# Patient Record
Sex: FEMALE | Race: WHITE | NOT HISPANIC OR LATINO | URBAN - METROPOLITAN AREA
[De-identification: names, ages, dates, MRNs, and addresses within clinical notes are randomized per-mention and may not be internally consistent; named-entity substitution may affect disease eponyms.]

---

## 2021-12-10 ENCOUNTER — OFFICE VISIT (OUTPATIENT)
Dept: PEDIATRICS | Facility: CLINIC | Age: 21
End: 2021-12-10
Payer: COMMERCIAL

## 2021-12-10 VITALS — DIASTOLIC BLOOD PRESSURE: 76 MMHG | HEART RATE: 83 BPM | SYSTOLIC BLOOD PRESSURE: 118 MMHG | WEIGHT: 150.19 LBS

## 2021-12-10 DIAGNOSIS — F90.0 ADHD (ATTENTION DEFICIT HYPERACTIVITY DISORDER), INATTENTIVE TYPE: Primary | ICD-10-CM

## 2021-12-10 DIAGNOSIS — F41.9 ANXIETY: ICD-10-CM

## 2021-12-10 PROCEDURE — 99999 PR PBB SHADOW E&M-EST. PATIENT-LVL II: CPT | Mod: PBBFAC,,, | Performed by: PEDIATRICS

## 2021-12-10 PROCEDURE — 99999 PR PBB SHADOW E&M-EST. PATIENT-LVL II: ICD-10-PCS | Mod: PBBFAC,,, | Performed by: PEDIATRICS

## 2021-12-10 PROCEDURE — 99214 PR OFFICE/OUTPT VISIT, EST, LEVL IV, 30-39 MIN: ICD-10-PCS | Mod: S$GLB,,, | Performed by: PEDIATRICS

## 2021-12-10 PROCEDURE — 99214 OFFICE O/P EST MOD 30 MIN: CPT | Mod: S$GLB,,, | Performed by: PEDIATRICS

## 2021-12-10 RX ORDER — LISDEXAMFETAMINE DIMESYLATE 30 MG/1
20 CAPSULE ORAL NIGHTLY
COMMUNITY
Start: 2021-10-21

## 2021-12-10 RX ORDER — LISDEXAMFETAMINE DIMESYLATE 30 MG/1
30 CAPSULE ORAL EVERY MORNING
Qty: 30 CAPSULE | Refills: 0 | Status: SHIPPED | OUTPATIENT
Start: 2022-01-09 | End: 2022-02-08

## 2021-12-10 RX ORDER — BUPROPION HYDROCHLORIDE 300 MG/1
300 TABLET ORAL DAILY
Qty: 30 TABLET | Refills: 2 | Status: SHIPPED | OUTPATIENT
Start: 2021-12-10 | End: 2022-03-10

## 2021-12-10 RX ORDER — LISDEXAMFETAMINE DIMESYLATE 30 MG/1
30 CAPSULE ORAL EVERY MORNING
Qty: 30 CAPSULE | Refills: 0 | Status: SHIPPED | OUTPATIENT
Start: 2022-02-08 | End: 2022-03-10

## 2021-12-10 RX ORDER — BUPROPION HYDROCHLORIDE 150 MG/1
300 TABLET ORAL DAILY
COMMUNITY
Start: 2021-10-21

## 2021-12-10 RX ORDER — LISDEXAMFETAMINE DIMESYLATE 30 MG/1
30 CAPSULE ORAL EVERY MORNING
Qty: 30 CAPSULE | Refills: 0 | Status: SHIPPED | OUTPATIENT
Start: 2021-12-10 | End: 2022-01-09

## 2022-01-03 NOTE — TELEPHONE ENCOUNTER
Refill request received from [facility] for Wellbutrin XL 300mg #90. Last anxiety and ADHD appt was 12/10/21. Medication was filled at appt on 12/10 for #30 with 2rf. Refill request denied. Appt due in march for refills.

## 2022-03-10 ENCOUNTER — TELEPHONE (OUTPATIENT)
Dept: PEDIATRICS | Facility: CLINIC | Age: 22
End: 2022-03-10
Payer: COMMERCIAL

## 2023-01-26 ENCOUNTER — OFFICE VISIT (OUTPATIENT)
Dept: PEDIATRICS | Facility: CLINIC | Age: 23
End: 2023-01-26
Payer: COMMERCIAL

## 2023-01-26 VITALS
WEIGHT: 162.25 LBS | HEART RATE: 81 BPM | SYSTOLIC BLOOD PRESSURE: 128 MMHG | TEMPERATURE: 98 F | BODY MASS INDEX: 25.47 KG/M2 | HEIGHT: 67 IN | DIASTOLIC BLOOD PRESSURE: 79 MMHG

## 2023-01-26 DIAGNOSIS — F90.2 ADHD (ATTENTION DEFICIT HYPERACTIVITY DISORDER), COMBINED TYPE: Primary | ICD-10-CM

## 2023-01-26 DIAGNOSIS — M41.9 SCOLIOSIS, UNSPECIFIED SCOLIOSIS TYPE, UNSPECIFIED SPINAL REGION: ICD-10-CM

## 2023-01-26 DIAGNOSIS — F41.9 ANXIETY: ICD-10-CM

## 2023-01-26 PROCEDURE — 3008F BODY MASS INDEX DOCD: CPT | Mod: CPTII,S$GLB,, | Performed by: PEDIATRICS

## 2023-01-26 PROCEDURE — 99395 PREV VISIT EST AGE 18-39: CPT | Mod: 25,S$GLB,, | Performed by: PEDIATRICS

## 2023-01-26 PROCEDURE — 3008F PR BODY MASS INDEX (BMI) DOCUMENTED: ICD-10-PCS | Mod: CPTII,S$GLB,, | Performed by: PEDIATRICS

## 2023-01-26 PROCEDURE — 3074F SYST BP LT 130 MM HG: CPT | Mod: CPTII,S$GLB,, | Performed by: PEDIATRICS

## 2023-01-26 PROCEDURE — 1159F PR MEDICATION LIST DOCUMENTED IN MEDICAL RECORD: ICD-10-PCS | Mod: CPTII,S$GLB,, | Performed by: PEDIATRICS

## 2023-01-26 PROCEDURE — 3078F PR MOST RECENT DIASTOLIC BLOOD PRESSURE < 80 MM HG: ICD-10-PCS | Mod: CPTII,S$GLB,, | Performed by: PEDIATRICS

## 2023-01-26 PROCEDURE — 3078F DIAST BP <80 MM HG: CPT | Mod: CPTII,S$GLB,, | Performed by: PEDIATRICS

## 2023-01-26 PROCEDURE — 99214 OFFICE O/P EST MOD 30 MIN: CPT | Mod: 25,S$GLB,, | Performed by: PEDIATRICS

## 2023-01-26 PROCEDURE — 1160F RVW MEDS BY RX/DR IN RCRD: CPT | Mod: CPTII,S$GLB,, | Performed by: PEDIATRICS

## 2023-01-26 PROCEDURE — 1160F PR REVIEW ALL MEDS BY PRESCRIBER/CLIN PHARMACIST DOCUMENTED: ICD-10-PCS | Mod: CPTII,S$GLB,, | Performed by: PEDIATRICS

## 2023-01-26 PROCEDURE — 99395 PR PREVENTIVE VISIT,EST,18-39: ICD-10-PCS | Mod: 25,S$GLB,, | Performed by: PEDIATRICS

## 2023-01-26 PROCEDURE — 99999 PR PBB SHADOW E&M-EST. PATIENT-LVL III: ICD-10-PCS | Mod: PBBFAC,,, | Performed by: PEDIATRICS

## 2023-01-26 PROCEDURE — 3074F PR MOST RECENT SYSTOLIC BLOOD PRESSURE < 130 MM HG: ICD-10-PCS | Mod: CPTII,S$GLB,, | Performed by: PEDIATRICS

## 2023-01-26 PROCEDURE — 99999 PR PBB SHADOW E&M-EST. PATIENT-LVL III: CPT | Mod: PBBFAC,,, | Performed by: PEDIATRICS

## 2023-01-26 PROCEDURE — 99214 PR OFFICE/OUTPT VISIT, EST, LEVL IV, 30-39 MIN: ICD-10-PCS | Mod: 25,S$GLB,, | Performed by: PEDIATRICS

## 2023-01-26 PROCEDURE — 1159F MED LIST DOCD IN RCRD: CPT | Mod: CPTII,S$GLB,, | Performed by: PEDIATRICS

## 2023-01-26 RX ORDER — LISDEXAMFETAMINE DIMESYLATE 30 MG/1
30 CAPSULE ORAL EVERY MORNING
Qty: 30 CAPSULE | Refills: 0 | Status: SHIPPED | OUTPATIENT
Start: 2023-03-27 | End: 2023-04-26

## 2023-01-26 RX ORDER — LISDEXAMFETAMINE DIMESYLATE 30 MG/1
30 CAPSULE ORAL EVERY MORNING
Qty: 30 CAPSULE | Refills: 0 | Status: SHIPPED | OUTPATIENT
Start: 2023-02-25 | End: 2023-03-27

## 2023-01-26 RX ORDER — LISDEXAMFETAMINE DIMESYLATE 30 MG/1
30 CAPSULE ORAL EVERY MORNING
Qty: 30 CAPSULE | Refills: 0 | Status: SHIPPED | OUTPATIENT
Start: 2023-01-26 | End: 2023-02-25

## 2023-01-26 RX ORDER — BUPROPION HYDROCHLORIDE 150 MG/1
150 TABLET ORAL DAILY
Qty: 90 TABLET | Refills: 0 | Status: SHIPPED | OUTPATIENT
Start: 2023-01-26 | End: 2023-04-26

## 2023-01-26 NOTE — PATIENT INSTRUCTIONS
Dr. Durham, Saima Willett Cook  Pediatric and Adolescent Medicine  [phone]        [facility]    Nutrition:  - Limit snacks, fast food, dessert, junk food.  Eat regular meals    Teeth:  FLOSS, brush minimum twice a day  Fluoride mouth wash, i. e. Act  - Dentist regularly  - You do want teeth after [age] years old?    Toxics:  - Alcohol, drugs, tobacco  Lots of availability, temptation    Driving:  over 5,000 college age die and >500,000 are injured each year from MVAs  Look three cars ahead  Do NOT text and drive  Wear your seat belt  Use your mirrors with constant surveillance    Breast Self Exam:  - 1/7 women develop breast cancer, check yourself regularly    Dating:  - Collins are sexually driven, girls have a need to be loved  - Do you want to be a parent right now?  - Abstain  - Learn from all your relationships  - If break-up, speak nicely of him/her  - You will find the right partner at the right time.    Academics:  - First semester of college is the:  --Easiest- much is review  --Hardest-tough transition, no supervision    - Formula for Success  Prepare- read through material before class  Attend class  Review notes same day as class  Back-it-up:  Before test study at least three days before test  Professor- pick, if possible, after reviews by friends or <Third Chicken> or <Medminder>  Meet your professor outside of class, so they know your face    -Extra help- take advantage of study skills classes, academic centers or tutors.  Get ahead of any potential problems.  - Ideas- study partners or groups, but must study (not just social)      Exercise:  Don't be a couch potato  Work toward an hour each day of aerobic exercise (min 30 minutes)  Play tennis, golf, swim, walk/run, etc    Stay in touch:  - Contact your parents regularly during your schooling and beyond- mom & dad  - text is really easy    Our office:  - We are honored to continue being your doctor at least until you finish  schooling.  - Once 18 years old, you have doctor/patient confidentiality.  Your parents cannot receive information from us unless you allow.    Immunizations:  - MCV4 (Meningococcal), need two, one after 16 years old  - TdaP (Tetanus)- within last 10 years  - HPV- helps cervical cancer in girls; urogenital cancer in boys and girls

## 2023-01-26 NOTE — PROGRESS NOTES
"  Subjective  Niya Melissa is a 22 y.o. female who is here for a checkup accompanied by sibling, who is a historian.      Subjective:     HISTORY:    Interval History / Parental Concerns: refill medications    School:[employer] majoring in Art History  Grade: Sophomore- did poorly last semester.   Progress/Grades:  good academic standing    Concerns:        Subjective:   HPI:    her  ADHD symptoms have improved when taking them since last visit.    Attentive with homework: N/A    Side effects of Medicine:  Sleep, appetite or other? none    Current ADHD Medication/Dose: Vyvanse 30 mg           Anxiety symptoms have worsened but has not been taking them consistently since last visit.    Counselor, if seeing: yes, Robyn Romeo LPC    Patient feels controlled on medication: yes  Panic attacks: yes    Current Anxiety Medication/Dose: Wellbutrin XL 150mg- taking intermittently  Any side effects of medication: dizziness, drowsiness, dry mouth, appetite changes?  none          Review of patient's allergies indicates:  No Known Allergies    Patient Active Problem List   Diagnosis    Scoliosis    Anxiety    ADHD (attention deficit hyperactivity disorder), combined type           Objective:      PHYSICAL EXAM  Vitals:    01/26/23 0937   BP: 128/79   BP Location: Left arm   Patient Position: Sitting   BP Method: Medium (Automatic)   Pulse: 81   Temp: 98.2 °F (36.8 °C)   TempSrc: Oral   Weight: 73.6 kg (162 lb 4 oz)   Height: 5' 7" (1.702 m)         Height Percentile for Age  Facility age limit for growth percentiles is 20 years.    Weight Percentile for Age  Facility age limit for growth percentiles is 20 years.    Body Mass Index  Body mass index is 25.41 kg/m².  Facility age limit for growth percentiles is 20 years.      Physical Exam  Vitals reviewed.   Constitutional:       Appearance: Normal appearance.   HENT:      Right Ear: Tympanic membrane normal.      Left Ear: Tympanic membrane normal.      Nose: " Nose normal.      Mouth/Throat:      Pharynx: Oropharynx is clear.   Eyes:      Conjunctiva/sclera: Conjunctivae normal.   Cardiovascular:      Rate and Rhythm: Normal rate and regular rhythm.      Heart sounds: Normal heart sounds. No murmur heard.    No friction rub. No gallop.   Pulmonary:      Breath sounds: Normal breath sounds.   Abdominal:      Palpations: Abdomen is soft.      Tenderness: There is no abdominal tenderness.   Musculoskeletal:         General: Normal range of motion.      Cervical back: Neck supple.   Skin:     Findings: No rash.   Neurological:      General: No focal deficit present.         Assessment/Plan:      ADHD (attention deficit hyperactivity disorder), combined type  -     lisdexamfetamine (VYVANSE) 30 MG capsule; Take 1 capsule (30 mg total) by mouth every morning.  Dispense: 30 capsule; Refill: 0  -     lisdexamfetamine (VYVANSE) 30 MG capsule; Take 1 capsule (30 mg total) by mouth every morning.  Dispense: 30 capsule; Refill: 0  -     lisdexamfetamine (VYVANSE) 30 MG capsule; Take 1 capsule (30 mg total) by mouth every morning.  Dispense: 30 capsule; Refill: 0    Anxiety  -     buPROPion (WELLBUTRIN XL) 150 MG TB24 tablet; Take 1 tablet (150 mg total) by mouth once daily.  Dispense: 90 tablet; Refill: 0    Scoliosis, unspecified scoliosis type, unspecified spinal region      Healthy     PLAN:  1.  Discussed anticipatory guidance (including nutrition, education, safety, dental care, discipline, family) and given age appropriate hand out  2.  Immunizations received.  May give Acetaminophen (Tylenol).  3.  Discussed after hours care and advice - call [phone] (our office).  4.  Follow-up at next well child visit (Regular Supervision Visit) in one year or sooner prn.        Next scheduled follow-up appointment for ADD/ADHD management will be in 3 months.  If changes are made to medications, then will need to follow up in three to four weeks.    We discussed the management goals for  patients with ADHD:  1. Adequate Control of Focus and/or Behavior.  2. Tolerable Medication Side Effects (Including some Loss of Appetite).  Need to maintain weight.  3. Function well in life, school and/or work.              Next scheduled follow-up appointment for Anxiety management will be in 3 months.  If changes are made to medications, then will need to follow up in three to four weeks.  Call or see us immediately if self harm thoughts surface.        We discussed the management goals for patients with Anxiety:  1. Adequate Control of Anxiety.  2. Better understanding of anxious feelings.  3. Tolerable Medication Side-Effects (Including Loss of Appetite and Insomnia).  4. Function well in life, school and/or work.      Keep core strength great.

## 2023-04-26 ENCOUNTER — OFFICE VISIT (OUTPATIENT)
Dept: PEDIATRICS | Facility: CLINIC | Age: 23
End: 2023-04-26
Payer: COMMERCIAL

## 2023-04-26 VITALS
WEIGHT: 166 LBS | TEMPERATURE: 98 F | HEIGHT: 67 IN | HEART RATE: 81 BPM | DIASTOLIC BLOOD PRESSURE: 84 MMHG | BODY MASS INDEX: 26.06 KG/M2 | SYSTOLIC BLOOD PRESSURE: 128 MMHG

## 2023-04-26 DIAGNOSIS — F90.2 ADHD (ATTENTION DEFICIT HYPERACTIVITY DISORDER), COMBINED TYPE: ICD-10-CM

## 2023-04-26 DIAGNOSIS — F41.9 ANXIETY: Primary | ICD-10-CM

## 2023-04-26 PROCEDURE — 3074F PR MOST RECENT SYSTOLIC BLOOD PRESSURE < 130 MM HG: ICD-10-PCS | Mod: CPTII,S$GLB,, | Performed by: PEDIATRICS

## 2023-04-26 PROCEDURE — 99214 PR OFFICE/OUTPT VISIT, EST, LEVL IV, 30-39 MIN: ICD-10-PCS | Mod: S$GLB,,, | Performed by: PEDIATRICS

## 2023-04-26 PROCEDURE — 3079F DIAST BP 80-89 MM HG: CPT | Mod: CPTII,S$GLB,, | Performed by: PEDIATRICS

## 2023-04-26 PROCEDURE — 99214 OFFICE O/P EST MOD 30 MIN: CPT | Mod: S$GLB,,, | Performed by: PEDIATRICS

## 2023-04-26 PROCEDURE — 3008F PR BODY MASS INDEX (BMI) DOCUMENTED: ICD-10-PCS | Mod: CPTII,S$GLB,, | Performed by: PEDIATRICS

## 2023-04-26 PROCEDURE — 3079F PR MOST RECENT DIASTOLIC BLOOD PRESSURE 80-89 MM HG: ICD-10-PCS | Mod: CPTII,S$GLB,, | Performed by: PEDIATRICS

## 2023-04-26 PROCEDURE — 1160F RVW MEDS BY RX/DR IN RCRD: CPT | Mod: CPTII,S$GLB,, | Performed by: PEDIATRICS

## 2023-04-26 PROCEDURE — 1159F MED LIST DOCD IN RCRD: CPT | Mod: CPTII,S$GLB,, | Performed by: PEDIATRICS

## 2023-04-26 PROCEDURE — 1159F PR MEDICATION LIST DOCUMENTED IN MEDICAL RECORD: ICD-10-PCS | Mod: CPTII,S$GLB,, | Performed by: PEDIATRICS

## 2023-04-26 PROCEDURE — 3008F BODY MASS INDEX DOCD: CPT | Mod: CPTII,S$GLB,, | Performed by: PEDIATRICS

## 2023-04-26 PROCEDURE — 1160F PR REVIEW ALL MEDS BY PRESCRIBER/CLIN PHARMACIST DOCUMENTED: ICD-10-PCS | Mod: CPTII,S$GLB,, | Performed by: PEDIATRICS

## 2023-04-26 PROCEDURE — 3074F SYST BP LT 130 MM HG: CPT | Mod: CPTII,S$GLB,, | Performed by: PEDIATRICS

## 2023-04-26 PROCEDURE — 99999 PR PBB SHADOW E&M-EST. PATIENT-LVL III: ICD-10-PCS | Mod: PBBFAC,,, | Performed by: PEDIATRICS

## 2023-04-26 PROCEDURE — 99999 PR PBB SHADOW E&M-EST. PATIENT-LVL III: CPT | Mod: PBBFAC,,, | Performed by: PEDIATRICS

## 2023-04-26 RX ORDER — LISDEXAMFETAMINE DIMESYLATE CAPSULES 20 MG/1
20 CAPSULE ORAL EVERY MORNING
Qty: 30 CAPSULE | Refills: 0 | Status: SHIPPED | OUTPATIENT
Start: 2023-04-26 | End: 2023-05-26

## 2023-04-26 RX ORDER — BUPROPION HYDROCHLORIDE 300 MG/1
300 TABLET ORAL DAILY
Qty: 30 TABLET | Refills: 0 | Status: SHIPPED | OUTPATIENT
Start: 2023-04-26 | End: 2023-05-26

## 2023-04-26 NOTE — PROGRESS NOTES
"    Subjective:   HPI:  Niya Melissa is a 22 y.o. patient here for follow up ADHD visit,  accompanied by patient and sister, who is a historian    her  ADHD symptoms have remained the same since last visit.    Attentive in afternoon (with homework): yes    Side effects of Medicine:    Trouble Sleeping; Appetite Changes; Jitteriness; Irritability or moodiness; Headaches or Stomach Aches; Other? Increased anxiety and feeling out of it because of Vyvanse- maybe- makes anxious    Level/Grade in School:[employer] Sophomore majoring Art History  Performance: good academic standing    Accommodations? No    Current ADHD Medication/Dose in am: Vyvanse 30 mg   Afternoon medicine?   Dose- none   Taking daily? Not taking daily, taking as needed    Concerns? Yes, possibly decreasing dosage on vyvanse           Anxiety symptoms have improved since last visit.    Counselor, if seeing: Светлана Lin LPC    Patient feels controlled on medication: yes  Panic attacks: yes but due to a trigger-     Current Anxiety Medication/Dose: Wellbutrin XL 150mg  Any side effects of medication: dizziness, drowsiness, dry mouth, appetite changes?  None    Would like to increase Wellbutrin to 300 XL- was taking that in High School.    Niya's allergies, medications, history, and problem list were updated as appropriate.    Review of patient's allergies indicates:   Allergen Reactions    Wasp venom Rash       Patient Active Problem List   Diagnosis    Scoliosis    Anxiety    ADHD (attention deficit hyperactivity disorder), combined type         Review of Systems  A comprehensive review of symptoms was completed and negative except as noted in the HPI.      Objective:     Vitals:    04/26/23 0929   BP: 128/84   BP Location: Left arm   Patient Position: Sitting   BP Method: Medium (Automatic)   Pulse: 81   Temp: 98.1 °F (36.7 °C)   TempSrc: Oral   Weight: 75.3 kg (166 lb)   Height: 5' 7" (1.702 m)       Last 3 Weights:   Wt Readings from Last 3 " Encounters:   04/26/23 0929 75.3 kg (166 lb)   01/26/23 0937 73.6 kg (162 lb 4 oz)   12/10/21 0813 68.1 kg (150 lb 3.2 oz)         Physical Exam  Vitals reviewed.   Constitutional:       Appearance: Normal appearance.   HENT:      Right Ear: Tympanic membrane normal.      Left Ear: Tympanic membrane normal.      Nose: Nose normal.      Mouth/Throat:      Pharynx: Oropharynx is clear.   Eyes:      Conjunctiva/sclera: Conjunctivae normal.   Cardiovascular:      Rate and Rhythm: Normal rate and regular rhythm.      Heart sounds: Normal heart sounds. No murmur heard.    No friction rub. No gallop.   Pulmonary:      Breath sounds: Normal breath sounds.   Abdominal:      Palpations: Abdomen is soft.      Tenderness: There is no abdominal tenderness.   Musculoskeletal:         General: Normal range of motion.      Cervical back: Neck supple.   Skin:     Findings: No rash.   Neurological:      General: No focal deficit present.         Assessment/Plan:        Anxiety  -     lisdexamfetamine (VYVANSE) 20 MG capsule; Take 1 capsule (20 mg total) by mouth every morning.  Dispense: 30 capsule; Refill: 0    ADHD (attention deficit hyperactivity disorder), combined type  -     buPROPion (WELLBUTRIN XL) 300 MG 24 hr tablet; Take 1 tablet (300 mg total) by mouth once daily.  Dispense: 30 tablet; Refill: 0            Outpatient Encounter Medications as of 4/26/2023   Medication Sig Dispense Refill    buPROPion (WELLBUTRIN XL) 150 MG TB24 tablet Take 1 tablet (150 mg total) by mouth once daily. 90 tablet 0    lisdexamfetamine (VYVANSE) 30 MG capsule Take 1 capsule (30 mg total) by mouth every morning. 30 capsule 0    buPROPion (WELLBUTRIN XL) 150 MG TB24 tablet Take 300 mg by mouth once daily.      buPROPion (WELLBUTRIN XL) 300 MG 24 hr tablet Take 1 tablet (300 mg total) by mouth once daily. 30 tablet 0    lisdexamfetamine (VYVANSE) 20 MG capsule Take 1 capsule (20 mg total) by mouth every morning. 30 capsule 0    VYVANSE 30 mg  capsule Take 30 mg by mouth nightly.       No facility-administered encounter medications on file as of 4/26/2023.         Next scheduled follow-up appointment for ADD/ADHD management will be in 3 months.  If changes are made to medications, then will need to follow up in three to four weeks.    We discussed the management goals for patients with ADHD:  1. Adequate Control of Focus and/or Behavior.  2. Tolerable Medication Side-Effects (Including some Loss of Appetite).  Need to maintain weight.  3. Function well in life, school and/or work.        Next scheduled follow-up appointment for Anxiety management will be in 3 months.  If changes are made to medications, then will need to follow up in three to four weeks.  Call or see us immediately if self harm thoughts surface.        We discussed the management goals for patients with Anxiety:  1. Adequate Control of Anxiety.  2. Better understanding of anxious feelings.  3. Tolerable Medication Side-Effects (Including Loss of Appetite and Insomnia).  4. Function well in life, school and/or work.      For proper use and less side effects- Vyvanse must be taken every day.    Decrease Vyvanse to 20 mg.- Take Daily.

## 2023-05-24 ENCOUNTER — OFFICE VISIT (OUTPATIENT)
Dept: PEDIATRICS | Facility: CLINIC | Age: 23
End: 2023-05-24
Payer: COMMERCIAL

## 2023-05-24 VITALS
BODY MASS INDEX: 25.82 KG/M2 | TEMPERATURE: 98 F | WEIGHT: 164.5 LBS | HEIGHT: 67 IN | SYSTOLIC BLOOD PRESSURE: 134 MMHG | DIASTOLIC BLOOD PRESSURE: 91 MMHG | HEART RATE: 93 BPM

## 2023-05-24 DIAGNOSIS — F90.2 ADHD (ATTENTION DEFICIT HYPERACTIVITY DISORDER), COMBINED TYPE: Primary | ICD-10-CM

## 2023-05-24 DIAGNOSIS — F41.9 ANXIETY: ICD-10-CM

## 2023-05-24 PROCEDURE — 3008F PR BODY MASS INDEX (BMI) DOCUMENTED: ICD-10-PCS | Mod: CPTII,S$GLB,, | Performed by: PEDIATRICS

## 2023-05-24 PROCEDURE — 3075F PR MOST RECENT SYSTOLIC BLOOD PRESS GE 130-139MM HG: ICD-10-PCS | Mod: CPTII,S$GLB,, | Performed by: PEDIATRICS

## 2023-05-24 PROCEDURE — 3080F PR MOST RECENT DIASTOLIC BLOOD PRESSURE >= 90 MM HG: ICD-10-PCS | Mod: CPTII,S$GLB,, | Performed by: PEDIATRICS

## 2023-05-24 PROCEDURE — 99999 PR PBB SHADOW E&M-EST. PATIENT-LVL III: CPT | Mod: PBBFAC,,, | Performed by: PEDIATRICS

## 2023-05-24 PROCEDURE — 3008F BODY MASS INDEX DOCD: CPT | Mod: CPTII,S$GLB,, | Performed by: PEDIATRICS

## 2023-05-24 PROCEDURE — 3075F SYST BP GE 130 - 139MM HG: CPT | Mod: CPTII,S$GLB,, | Performed by: PEDIATRICS

## 2023-05-24 PROCEDURE — 3080F DIAST BP >= 90 MM HG: CPT | Mod: CPTII,S$GLB,, | Performed by: PEDIATRICS

## 2023-05-24 PROCEDURE — 99214 OFFICE O/P EST MOD 30 MIN: CPT | Mod: S$GLB,,, | Performed by: PEDIATRICS

## 2023-05-24 PROCEDURE — 1159F PR MEDICATION LIST DOCUMENTED IN MEDICAL RECORD: ICD-10-PCS | Mod: CPTII,S$GLB,, | Performed by: PEDIATRICS

## 2023-05-24 PROCEDURE — 1159F MED LIST DOCD IN RCRD: CPT | Mod: CPTII,S$GLB,, | Performed by: PEDIATRICS

## 2023-05-24 PROCEDURE — 99999 PR PBB SHADOW E&M-EST. PATIENT-LVL III: ICD-10-PCS | Mod: PBBFAC,,, | Performed by: PEDIATRICS

## 2023-05-24 PROCEDURE — 99214 PR OFFICE/OUTPT VISIT, EST, LEVL IV, 30-39 MIN: ICD-10-PCS | Mod: S$GLB,,, | Performed by: PEDIATRICS

## 2023-05-24 RX ORDER — LISDEXAMFETAMINE DIMESYLATE CAPSULES 20 MG/1
20 CAPSULE ORAL EVERY MORNING
Qty: 30 CAPSULE | Refills: 0 | Status: SHIPPED | OUTPATIENT
Start: 2023-07-23 | End: 2023-08-22

## 2023-05-24 RX ORDER — LISDEXAMFETAMINE DIMESYLATE CAPSULES 20 MG/1
20 CAPSULE ORAL EVERY MORNING
Qty: 30 CAPSULE | Refills: 0 | Status: SHIPPED | OUTPATIENT
Start: 2023-05-24 | End: 2023-06-23

## 2023-05-24 RX ORDER — LISDEXAMFETAMINE DIMESYLATE CAPSULES 20 MG/1
20 CAPSULE ORAL EVERY MORNING
Qty: 30 CAPSULE | Refills: 0 | Status: SHIPPED | OUTPATIENT
Start: 2023-06-23 | End: 2023-07-23

## 2023-05-24 RX ORDER — BUPROPION HYDROCHLORIDE 300 MG/1
300 TABLET ORAL DAILY
Qty: 90 TABLET | Refills: 0 | Status: SHIPPED | OUTPATIENT
Start: 2023-05-24 | End: 2023-08-22

## 2023-05-24 NOTE — PROGRESS NOTES
"    Subjective:   HPI:  Niya Melissa is a 22 y.o. patient here for follow up ADHD visit,  alone, who is a historian    her  ADHD symptoms have remained the same since last visit.    Attentive in afternoon (with homework): yes    Side effects of Medicine:    Trouble Sleeping; Appetite Changes; Jitteriness; Irritability or moodiness; Headaches or Stomach Aches; Other? none    Level/Grade in School:[employer] Majoring in Art History  Performance: good academic standing    Accommodations? No    Current ADHD Medication/Dose in am: Vyvanse 20 mg   Afternoon medicine?   Dose- none   Taking daily? Not taking daily but working to become more consistent     Concerns? no             Anxiety symptoms have gradually improving since last visit.    Counselor, if seeing: [facility]    Patient feels controlled on medication: yes  Panic attacks: no panic attacks but feeling overstimulated    Current Anxiety Medication/Dose: Wellbutrin  mg  Any side effects of medication: dizziness, drowsiness, dry mouth, appetite changes?  None    Niya's allergies, medications, history, and problem list were updated as appropriate.    Review of patient's allergies indicates:   Allergen Reactions    Wasp venom Rash       Patient Active Problem List   Diagnosis    Scoliosis    Anxiety    ADHD (attention deficit hyperactivity disorder), combined type         Review of Systems  A comprehensive review of symptoms was completed and negative except as noted in the HPI.      Objective:     Vitals:    05/24/23 0915   BP: (!) 134/91   BP Location: Left arm   Patient Position: Sitting   BP Method: Medium (Automatic)   Pulse: 93   Temp: 98.3 °F (36.8 °C)   TempSrc: Oral   Weight: 74.6 kg (164 lb 8 oz)   Height: 5' 7" (1.702 m)       Last 3 Weights:   Wt Readings from Last 3 Encounters:   05/24/23 0915 74.6 kg (164 lb 8 oz)   04/26/23 0929 75.3 kg (166 lb)   01/26/23 0937 73.6 kg (162 lb 4 oz)         Physical Exam  Vitals reviewed. "   Constitutional:       Appearance: Normal appearance.   HENT:      Right Ear: Tympanic membrane normal.      Left Ear: Tympanic membrane normal.      Nose: Nose normal.      Mouth/Throat:      Pharynx: Oropharynx is clear.   Eyes:      Conjunctiva/sclera: Conjunctivae normal.   Cardiovascular:      Rate and Rhythm: Normal rate and regular rhythm.      Heart sounds: Normal heart sounds. No murmur heard.    No friction rub. No gallop.   Pulmonary:      Breath sounds: Normal breath sounds.   Abdominal:      Palpations: Abdomen is soft.      Tenderness: There is no abdominal tenderness.   Musculoskeletal:         General: Normal range of motion.      Cervical back: Neck supple.   Skin:     Findings: No rash.   Neurological:      General: No focal deficit present.         Assessment/Plan:        ADHD (attention deficit hyperactivity disorder), combined type  -     lisdexamfetamine (VYVANSE) 20 MG capsule; Take 1 capsule (20 mg total) by mouth every morning.  Dispense: 30 capsule; Refill: 0  -     lisdexamfetamine (VYVANSE) 20 MG capsule; Take 1 capsule (20 mg total) by mouth every morning.  Dispense: 30 capsule; Refill: 0  -     lisdexamfetamine (VYVANSE) 20 MG capsule; Take 1 capsule (20 mg total) by mouth every morning.  Dispense: 30 capsule; Refill: 0    Anxiety  -     buPROPion (WELLBUTRIN XL) 300 MG 24 hr tablet; Take 1 tablet (300 mg total) by mouth once daily.  Dispense: 90 tablet; Refill: 0            Outpatient Encounter Medications as of 5/24/2023   Medication Sig Dispense Refill    buPROPion (WELLBUTRIN XL) 300 MG 24 hr tablet Take 1 tablet (300 mg total) by mouth once daily. 30 tablet 0    lisdexamfetamine (VYVANSE) 20 MG capsule Take 1 capsule (20 mg total) by mouth every morning. 30 capsule 0    buPROPion (WELLBUTRIN XL) 150 MG TB24 tablet Take 300 mg by mouth once daily.      buPROPion (WELLBUTRIN XL) 150 MG TB24 tablet Take 1 tablet (150 mg total) by mouth once daily. 90 tablet 0    buPROPion (WELLBUTRIN  XL) 300 MG 24 hr tablet Take 1 tablet (300 mg total) by mouth once daily. 90 tablet 0    lisdexamfetamine (VYVANSE) 20 MG capsule Take 1 capsule (20 mg total) by mouth every morning. 30 capsule 0    [START ON 6/23/2023] lisdexamfetamine (VYVANSE) 20 MG capsule Take 1 capsule (20 mg total) by mouth every morning. 30 capsule 0    [START ON 7/23/2023] lisdexamfetamine (VYVANSE) 20 MG capsule Take 1 capsule (20 mg total) by mouth every morning. 30 capsule 0    lisdexamfetamine (VYVANSE) 30 MG capsule Take 1 capsule (30 mg total) by mouth every morning. 30 capsule 0    VYVANSE 30 mg capsule Take 30 mg by mouth nightly.       No facility-administered encounter medications on file as of 5/24/2023.         Next scheduled follow-up appointment for ADD/ADHD management will be in 3 months.  If changes are made to medications, then will need to follow up in three to four weeks.    We discussed the management goals for patients with ADHD:  1. Adequate Control of Focus and/or Behavior.  2. Tolerable Medication Side-Effects (Including some Loss of Appetite).  Need to maintain weight.  3. Function well in life, school and/or work.

## 2023-09-12 DIAGNOSIS — F41.9 ANXIETY: ICD-10-CM

## 2023-09-12 RX ORDER — BUPROPION HYDROCHLORIDE 300 MG/1
300 TABLET ORAL
Qty: 90 TABLET | Refills: 0 | OUTPATIENT
Start: 2023-09-12

## 2023-09-19 ENCOUNTER — OFFICE VISIT (OUTPATIENT)
Dept: PEDIATRICS | Facility: CLINIC | Age: 23
End: 2023-09-19
Payer: COMMERCIAL

## 2023-09-19 VITALS
BODY MASS INDEX: 24.61 KG/M2 | DIASTOLIC BLOOD PRESSURE: 84 MMHG | HEART RATE: 90 BPM | TEMPERATURE: 98 F | WEIGHT: 156.81 LBS | HEIGHT: 67 IN | SYSTOLIC BLOOD PRESSURE: 131 MMHG

## 2023-09-19 DIAGNOSIS — F90.2 ADHD (ATTENTION DEFICIT HYPERACTIVITY DISORDER), COMBINED TYPE: Primary | ICD-10-CM

## 2023-09-19 DIAGNOSIS — F41.9 ANXIETY: ICD-10-CM

## 2023-09-19 PROCEDURE — 99214 PR OFFICE/OUTPT VISIT, EST, LEVL IV, 30-39 MIN: ICD-10-PCS | Mod: 25,S$GLB,, | Performed by: PEDIATRICS

## 2023-09-19 PROCEDURE — 3008F PR BODY MASS INDEX (BMI) DOCUMENTED: ICD-10-PCS | Mod: CPTII,S$GLB,, | Performed by: PEDIATRICS

## 2023-09-19 PROCEDURE — 99214 OFFICE O/P EST MOD 30 MIN: CPT | Mod: 25,S$GLB,, | Performed by: PEDIATRICS

## 2023-09-19 PROCEDURE — 3075F PR MOST RECENT SYSTOLIC BLOOD PRESS GE 130-139MM HG: ICD-10-PCS | Mod: CPTII,S$GLB,, | Performed by: PEDIATRICS

## 2023-09-19 PROCEDURE — 3079F PR MOST RECENT DIASTOLIC BLOOD PRESSURE 80-89 MM HG: ICD-10-PCS | Mod: CPTII,S$GLB,, | Performed by: PEDIATRICS

## 2023-09-19 PROCEDURE — 1159F MED LIST DOCD IN RCRD: CPT | Mod: CPTII,S$GLB,, | Performed by: PEDIATRICS

## 2023-09-19 PROCEDURE — 99999 PR PBB SHADOW E&M-EST. PATIENT-LVL III: CPT | Mod: PBBFAC,,, | Performed by: PEDIATRICS

## 2023-09-19 PROCEDURE — 90686 IIV4 VACC NO PRSV 0.5 ML IM: CPT | Mod: S$GLB,,, | Performed by: PEDIATRICS

## 2023-09-19 PROCEDURE — 90686 FLU VACCINE (QUAD) GREATER THAN OR EQUAL TO 3YO PRESERVATIVE FREE IM: ICD-10-PCS | Mod: S$GLB,,, | Performed by: PEDIATRICS

## 2023-09-19 PROCEDURE — 99999 PR PBB SHADOW E&M-EST. PATIENT-LVL III: ICD-10-PCS | Mod: PBBFAC,,, | Performed by: PEDIATRICS

## 2023-09-19 PROCEDURE — 3079F DIAST BP 80-89 MM HG: CPT | Mod: CPTII,S$GLB,, | Performed by: PEDIATRICS

## 2023-09-19 PROCEDURE — 1159F PR MEDICATION LIST DOCUMENTED IN MEDICAL RECORD: ICD-10-PCS | Mod: CPTII,S$GLB,, | Performed by: PEDIATRICS

## 2023-09-19 PROCEDURE — 3008F BODY MASS INDEX DOCD: CPT | Mod: CPTII,S$GLB,, | Performed by: PEDIATRICS

## 2023-09-19 PROCEDURE — 3075F SYST BP GE 130 - 139MM HG: CPT | Mod: CPTII,S$GLB,, | Performed by: PEDIATRICS

## 2023-09-19 PROCEDURE — 90471 FLU VACCINE (QUAD) GREATER THAN OR EQUAL TO 3YO PRESERVATIVE FREE IM: ICD-10-PCS | Mod: S$GLB,,, | Performed by: PEDIATRICS

## 2023-09-19 PROCEDURE — 90471 IMMUNIZATION ADMIN: CPT | Mod: S$GLB,,, | Performed by: PEDIATRICS

## 2023-09-19 RX ORDER — BUPROPION HYDROCHLORIDE 300 MG/1
300 TABLET ORAL DAILY
Qty: 90 TABLET | Refills: 0 | Status: SHIPPED | OUTPATIENT
Start: 2023-09-19 | End: 2023-12-18

## 2023-09-19 RX ORDER — LISDEXAMFETAMINE DIMESYLATE CAPSULES 20 MG/1
20 CAPSULE ORAL EVERY MORNING
Qty: 30 CAPSULE | Refills: 0 | Status: SHIPPED | OUTPATIENT
Start: 2023-10-19 | End: 2023-11-18

## 2023-09-19 RX ORDER — LISDEXAMFETAMINE DIMESYLATE CAPSULES 20 MG/1
20 CAPSULE ORAL EVERY MORNING
Qty: 30 CAPSULE | Refills: 0 | Status: SHIPPED | OUTPATIENT
Start: 2023-11-18 | End: 2023-12-18

## 2023-09-19 RX ORDER — LISDEXAMFETAMINE DIMESYLATE CAPSULES 20 MG/1
20 CAPSULE ORAL EVERY MORNING
Qty: 30 CAPSULE | Refills: 0 | Status: SHIPPED | OUTPATIENT
Start: 2023-09-19 | End: 2023-10-19

## 2023-09-19 NOTE — PROGRESS NOTES
"    Subjective:   HPI:  Niya Melissa is a 22 y.o. patient here for follow up ADHD visit,  alone, who is a historian    her  ADHD symptoms have remained the same since last visit.    Attentive in afternoon (with homework): none    Side effects of Medicine:    Trouble Sleeping; Appetite Changes; Jitteriness; Irritability or moodiness; Headaches or Stomach Aches; Other? none    Level/Grade in School:Working at [employer] currently will return to school next fall  Performance: none    Accommodations? Yes    Current ADHD Medication/Dose in am: Vyvanse 30 mg   Afternoon medicine?   Dose- noen   Taking daily? Yes    Concerns? no             Anxiety symptoms have improved slightly since last visit.    Counselor, if seeing: yes, Светлана Lin LPC    Patient feels controlled on medication: yes  Panic attacks: none    Current Anxiety Medication/Dose: Wellbutrin XL 300mg  Any side effects of medication: dizziness, drowsiness, dry mouth, appetite changes?  none    Niya's allergies, medications, history, and problem list were updated as appropriate.    Review of patient's allergies indicates:   Allergen Reactions    Wasp venom Rash       Patient Active Problem List   Diagnosis    Scoliosis    Anxiety    ADHD (attention deficit hyperactivity disorder), combined type         Review of Systems  A comprehensive review of symptoms was completed and negative except as noted in the HPI.      Objective:     Vitals:    09/19/23 0937   BP: 131/84   BP Location: Left arm   Patient Position: Sitting   BP Method: Medium (Automatic)   Pulse: 90   Temp: 98.3 °F (36.8 °C)   TempSrc: Oral   Weight: 71.1 kg (156 lb 12.8 oz)   Height: 5' 7" (1.702 m)       Last 3 Weights:   Wt Readings from Last 3 Encounters:   09/19/23 0937 71.1 kg (156 lb 12.8 oz)   05/24/23 0915 74.6 kg (164 lb 8 oz)   04/26/23 0929 75.3 kg (166 lb)         Physical Exam  Vitals reviewed.   Constitutional:       Appearance: Normal appearance.   HENT:      Right Ear: " Tympanic membrane normal.      Left Ear: Tympanic membrane normal.      Nose: Nose normal.      Mouth/Throat:      Pharynx: Oropharynx is clear.   Eyes:      Conjunctiva/sclera: Conjunctivae normal.   Cardiovascular:      Rate and Rhythm: Normal rate and regular rhythm.      Heart sounds: Normal heart sounds. No murmur heard.     No friction rub. No gallop.   Pulmonary:      Breath sounds: Normal breath sounds.   Abdominal:      Palpations: Abdomen is soft.      Tenderness: There is no abdominal tenderness.   Musculoskeletal:         General: Normal range of motion.      Cervical back: Neck supple.   Skin:     Findings: No rash.   Neurological:      General: No focal deficit present.           Assessment/Plan:        ADHD (attention deficit hyperactivity disorder), combined type  -     lisdexamfetamine (VYVANSE) 20 MG capsule; Take 1 capsule (20 mg total) by mouth every morning.  Dispense: 30 capsule; Refill: 0  -     lisdexamfetamine (VYVANSE) 20 MG capsule; Take 1 capsule (20 mg total) by mouth every morning.  Dispense: 30 capsule; Refill: 0  -     lisdexamfetamine (VYVANSE) 20 MG capsule; Take 1 capsule (20 mg total) by mouth every morning.  Dispense: 30 capsule; Refill: 0    Anxiety  -     buPROPion (WELLBUTRIN XL) 300 MG 24 hr tablet; Take 1 tablet (300 mg total) by mouth once daily.  Dispense: 90 tablet; Refill: 0    Other orders  -     Influenza - Quadrivalent *Preferred* (6 months+) (PF)            Outpatient Encounter Medications as of 9/19/2023   Medication Sig Dispense Refill    buPROPion (WELLBUTRIN XL) 150 MG TB24 tablet Take 300 mg by mouth once daily.      VYVANSE 30 mg capsule Take 30 mg by mouth nightly.      buPROPion (WELLBUTRIN XL) 300 MG 24 hr tablet Take 1 tablet (300 mg total) by mouth once daily. 90 tablet 0    lisdexamfetamine (VYVANSE) 20 MG capsule Take 1 capsule (20 mg total) by mouth every morning. 30 capsule 0    [START ON 10/19/2023] lisdexamfetamine (VYVANSE) 20 MG capsule Take 1  capsule (20 mg total) by mouth every morning. 30 capsule 0    [START ON 11/18/2023] lisdexamfetamine (VYVANSE) 20 MG capsule Take 1 capsule (20 mg total) by mouth every morning. 30 capsule 0     No facility-administered encounter medications on file as of 9/19/2023.         Next scheduled follow-up appointment for ADD/ADHD management will be in 3 months.  If changes are made to medications, then will need to follow up in three to four weeks.    We discussed the management goals for patients with ADHD:  1. Adequate Control of Focus and/or Behavior.  2. Tolerable Medication Side-Effects (Including some Loss of Appetite).  Need to maintain weight.  3. Function well in life, school and/or work.

## 2023-10-18 ENCOUNTER — PATIENT OUTREACH (OUTPATIENT)
Dept: ADMINISTRATIVE | Facility: HOSPITAL | Age: 23
End: 2023-10-18
Payer: COMMERCIAL

## 2023-12-20 ENCOUNTER — OFFICE VISIT (OUTPATIENT)
Dept: PEDIATRICS | Facility: CLINIC | Age: 23
End: 2023-12-20
Payer: COMMERCIAL

## 2023-12-20 VITALS
SYSTOLIC BLOOD PRESSURE: 123 MMHG | HEART RATE: 91 BPM | TEMPERATURE: 98 F | BODY MASS INDEX: 24.36 KG/M2 | DIASTOLIC BLOOD PRESSURE: 86 MMHG | WEIGHT: 155.19 LBS | HEIGHT: 67 IN

## 2023-12-20 DIAGNOSIS — F90.2 ADHD (ATTENTION DEFICIT HYPERACTIVITY DISORDER), COMBINED TYPE: Primary | ICD-10-CM

## 2023-12-20 DIAGNOSIS — F41.9 ANXIETY: ICD-10-CM

## 2023-12-20 PROCEDURE — 99999 PR PBB SHADOW E&M-EST. PATIENT-LVL III: CPT | Mod: PBBFAC,,, | Performed by: PEDIATRICS

## 2023-12-20 PROCEDURE — 1160F PR REVIEW ALL MEDS BY PRESCRIBER/CLIN PHARMACIST DOCUMENTED: ICD-10-PCS | Mod: CPTII,S$GLB,, | Performed by: PEDIATRICS

## 2023-12-20 PROCEDURE — 3079F DIAST BP 80-89 MM HG: CPT | Mod: CPTII,S$GLB,, | Performed by: PEDIATRICS

## 2023-12-20 PROCEDURE — 3008F BODY MASS INDEX DOCD: CPT | Mod: CPTII,S$GLB,, | Performed by: PEDIATRICS

## 2023-12-20 PROCEDURE — 99214 PR OFFICE/OUTPT VISIT, EST, LEVL IV, 30-39 MIN: ICD-10-PCS | Mod: S$GLB,,, | Performed by: PEDIATRICS

## 2023-12-20 PROCEDURE — 99214 OFFICE O/P EST MOD 30 MIN: CPT | Mod: S$GLB,,, | Performed by: PEDIATRICS

## 2023-12-20 PROCEDURE — 3074F PR MOST RECENT SYSTOLIC BLOOD PRESSURE < 130 MM HG: ICD-10-PCS | Mod: CPTII,S$GLB,, | Performed by: PEDIATRICS

## 2023-12-20 PROCEDURE — 3008F PR BODY MASS INDEX (BMI) DOCUMENTED: ICD-10-PCS | Mod: CPTII,S$GLB,, | Performed by: PEDIATRICS

## 2023-12-20 PROCEDURE — 99999 PR PBB SHADOW E&M-EST. PATIENT-LVL III: ICD-10-PCS | Mod: PBBFAC,,, | Performed by: PEDIATRICS

## 2023-12-20 PROCEDURE — 1159F MED LIST DOCD IN RCRD: CPT | Mod: CPTII,S$GLB,, | Performed by: PEDIATRICS

## 2023-12-20 PROCEDURE — 1159F PR MEDICATION LIST DOCUMENTED IN MEDICAL RECORD: ICD-10-PCS | Mod: CPTII,S$GLB,, | Performed by: PEDIATRICS

## 2023-12-20 PROCEDURE — 1160F RVW MEDS BY RX/DR IN RCRD: CPT | Mod: CPTII,S$GLB,, | Performed by: PEDIATRICS

## 2023-12-20 PROCEDURE — 3079F PR MOST RECENT DIASTOLIC BLOOD PRESSURE 80-89 MM HG: ICD-10-PCS | Mod: CPTII,S$GLB,, | Performed by: PEDIATRICS

## 2023-12-20 PROCEDURE — 3074F SYST BP LT 130 MM HG: CPT | Mod: CPTII,S$GLB,, | Performed by: PEDIATRICS

## 2023-12-20 RX ORDER — LISDEXAMFETAMINE DIMESYLATE CAPSULES 20 MG/1
20 CAPSULE ORAL EVERY MORNING
Qty: 30 CAPSULE | Refills: 0 | Status: SHIPPED | OUTPATIENT
Start: 2024-01-19 | End: 2024-02-18

## 2023-12-20 RX ORDER — LISDEXAMFETAMINE DIMESYLATE CAPSULES 20 MG/1
20 CAPSULE ORAL EVERY MORNING
Qty: 30 CAPSULE | Refills: 0 | Status: SHIPPED | OUTPATIENT
Start: 2024-02-18 | End: 2024-03-20

## 2023-12-20 RX ORDER — BUPROPION HYDROCHLORIDE 300 MG/1
300 TABLET ORAL DAILY
Qty: 90 TABLET | Refills: 0 | Status: SHIPPED | OUTPATIENT
Start: 2023-12-20 | End: 2024-03-20

## 2023-12-20 RX ORDER — LISDEXAMFETAMINE DIMESYLATE CAPSULES 20 MG/1
20 CAPSULE ORAL EVERY MORNING
Qty: 30 CAPSULE | Refills: 0 | Status: SHIPPED | OUTPATIENT
Start: 2023-12-20 | End: 2024-01-19

## 2023-12-20 NOTE — PROGRESS NOTES
"  Subjective  Niya Melissa is a 23 y.o. female who is here for a checkup alone, who is a historian.      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   Grade: [employer] Year   Progress/Grades:  GPA: N/A, pt has been out of school- going back this Summer    Concerns:  None        Subjective:   HPI:    her  ADHD symptoms have stayed the same since last visit.    Attentive with homework: Yes    Side effects of Medicine:  Sleep, appetite or other? No    Current ADHD Medication/Dose: Vyvanse 20 mg                Anxiety symptoms have improved since last visit.    Counselor, if seeing: Yes, Dr. Светлана Lin    Patient feels controlled on medication: Yes  Panic attacks: No    Current Anxiety Medication/Dose: Wellbutrin 300 mg  Any side effects of medication: dizziness, drowsiness, dry mouth, appetite changes?  No       Review of patient's allergies indicates:   Allergen Reactions    Wasp venom Rash       Patient Active Problem List   Diagnosis    Scoliosis    Anxiety    ADHD (attention deficit hyperactivity disorder), combined type           Objective:      PHYSICAL EXAM  Vitals:    12/20/23 0929   BP: 123/86   BP Location: Left arm   Patient Position: Sitting   BP Method: Medium (Automatic)   Pulse: 91   Temp: 98.2 °F (36.8 °C)   TempSrc: Oral   Weight: 70.4 kg (155 lb 3.2 oz)   Height: 5' 6.75" (1.695 m)         Height Percentile for Age  Facility age limit for growth %aurelio is 20 years.    Weight Percentile for Age  Facility age limit for growth %aurelio is 20 years.    Body Mass Index  Body mass index is 24.49 kg/m².  Facility age limit for growth %aurelio is 20 years.      Physical Exam      Assessment/Plan:      ADHD (attention deficit hyperactivity disorder), combined type  -     lisdexamfetamine (VYVANSE) 20 MG capsule; Take 1 capsule (20 mg total) by mouth every morning.  Dispense: 30 capsule; Refill: 0  -     " lisdexamfetamine (VYVANSE) 20 MG capsule; Take 1 capsule (20 mg total) by mouth every morning.  Dispense: 30 capsule; Refill: 0  -     lisdexamfetamine (VYVANSE) 20 MG capsule; Take 1 capsule (20 mg total) by mouth every morning.  Dispense: 30 capsule; Refill: 0    Anxiety  -     buPROPion (WELLBUTRIN XL) 300 MG 24 hr tablet; Take 1 tablet (300 mg total) by mouth once daily.  Dispense: 90 tablet; Refill: 0      Healthy     PLAN:  1.  Discussed anticipatory guidance (including nutrition, education, safety, dental care, discipline, family, exercise, physical acticvity) and given age appropriate hand out.   Age appropriate physical activity and nutritional counseling were completed during today's visit.  2.  Immunizations received.  May give Acetaminophen (Tylenol).  3.  Discussed after hours care and advice - call [phone] (our office).  4.  Follow-up at next well child visit (Regular Supervision Visit) in one year or sooner prn.

## 2024-03-19 RX ORDER — BUPROPION HYDROCHLORIDE 300 MG/1
300 TABLET ORAL DAILY
Qty: 90 TABLET | Refills: 0 | Status: CANCELLED | OUTPATIENT
Start: 2024-03-19 | End: 2024-06-17

## 2024-03-19 RX ORDER — LISDEXAMFETAMINE DIMESYLATE CAPSULES 20 MG/1
20 CAPSULE ORAL EVERY MORNING
Qty: 30 CAPSULE | Refills: 0 | Status: CANCELLED | OUTPATIENT
Start: 2024-03-19 | End: 2024-04-18

## 2024-03-19 RX ORDER — LISDEXAMFETAMINE DIMESYLATE CAPSULES 20 MG/1
20 CAPSULE ORAL EVERY MORNING
Qty: 30 CAPSULE | Refills: 0 | Status: CANCELLED | OUTPATIENT
Start: 2024-04-18 | End: 2024-05-18

## 2024-03-19 RX ORDER — LISDEXAMFETAMINE DIMESYLATE CAPSULES 20 MG/1
20 CAPSULE ORAL EVERY MORNING
Qty: 30 CAPSULE | Refills: 0 | Status: CANCELLED | OUTPATIENT
Start: 2024-05-18 | End: 2024-06-17

## 2024-03-20 ENCOUNTER — OFFICE VISIT (OUTPATIENT)
Dept: PEDIATRICS | Facility: CLINIC | Age: 24
End: 2024-03-20
Payer: COMMERCIAL

## 2024-03-20 VITALS
DIASTOLIC BLOOD PRESSURE: 84 MMHG | WEIGHT: 146.31 LBS | BODY MASS INDEX: 22.97 KG/M2 | TEMPERATURE: 98 F | SYSTOLIC BLOOD PRESSURE: 123 MMHG | HEIGHT: 67 IN | HEART RATE: 83 BPM

## 2024-03-20 DIAGNOSIS — F90.2 ADHD (ATTENTION DEFICIT HYPERACTIVITY DISORDER), COMBINED TYPE: Primary | ICD-10-CM

## 2024-03-20 DIAGNOSIS — F41.9 ANXIETY: ICD-10-CM

## 2024-03-20 PROCEDURE — 3074F SYST BP LT 130 MM HG: CPT | Mod: CPTII,S$GLB,, | Performed by: PEDIATRICS

## 2024-03-20 PROCEDURE — 3079F DIAST BP 80-89 MM HG: CPT | Mod: CPTII,S$GLB,, | Performed by: PEDIATRICS

## 2024-03-20 PROCEDURE — 1159F MED LIST DOCD IN RCRD: CPT | Mod: CPTII,S$GLB,, | Performed by: PEDIATRICS

## 2024-03-20 PROCEDURE — 99214 OFFICE O/P EST MOD 30 MIN: CPT | Mod: S$GLB,,, | Performed by: PEDIATRICS

## 2024-03-20 PROCEDURE — 3008F BODY MASS INDEX DOCD: CPT | Mod: CPTII,S$GLB,, | Performed by: PEDIATRICS

## 2024-03-20 PROCEDURE — 99999 PR PBB SHADOW E&M-EST. PATIENT-LVL III: CPT | Mod: PBBFAC,,, | Performed by: PEDIATRICS

## 2024-03-20 PROCEDURE — 1160F RVW MEDS BY RX/DR IN RCRD: CPT | Mod: CPTII,S$GLB,, | Performed by: PEDIATRICS

## 2024-03-20 RX ORDER — LISDEXAMFETAMINE DIMESYLATE CAPSULES 20 MG/1
20 CAPSULE ORAL EVERY MORNING
Qty: 30 CAPSULE | Refills: 0 | Status: SHIPPED | OUTPATIENT
Start: 2024-03-20 | End: 2024-04-19

## 2024-03-20 RX ORDER — BUPROPION HYDROCHLORIDE 150 MG/1
150 TABLET ORAL DAILY
Qty: 30 TABLET | Refills: 0 | Status: SHIPPED | OUTPATIENT
Start: 2024-03-20 | End: 2024-04-19

## 2024-03-20 RX ORDER — BUPROPION HYDROCHLORIDE 300 MG/1
300 TABLET ORAL DAILY
Qty: 30 TABLET | Refills: 0 | Status: SHIPPED | OUTPATIENT
Start: 2024-03-20 | End: 2024-04-19

## 2024-03-20 NOTE — PROGRESS NOTES
"    Subjective:   HPI:  Niya Melissa is a 23 y.o. patient here for follow up ADHD visit,  alone, who is a historian    her  ADHD symptoms have improved since last visit.    Attentive in afternoon (with homework): yes    Side effects of Medicine:    Trouble Sleeping; Appetite Changes; Jitteriness; Irritability or moodiness; Headaches or Stomach Aches; Other? none    Level/Grade in School:Working at [employer]  Performance: going well    Accommodations? No    Current ADHD Medication/Dose in am: Vyvanse 20 mg   Afternoon medicine?   Dose- none   Taking daily? Yes    Concerns? no           Anxiety symptoms have slightly improved since last visit.    Counselor, if seeing: Yes, Светлана Lin L[facility] at [facility]    Patient feels controlled on medication: yes  Panic attacks: none    Current Anxiety Medication/Dose: Wellbutrin  mg  Any side effects of medication: dizziness, drowsiness, dry mouth, appetite changes?  None  Concerns? Have improved but still not the best    Last RHS:  12/20/23    Niya's allergies, medications, history, and problem list were updated as appropriate.    Review of patient's allergies indicates:   Allergen Reactions    Wasp venom Rash       Patient Active Problem List   Diagnosis    Scoliosis    Anxiety    ADHD (attention deficit hyperactivity disorder), combined type         Review of Systems  A comprehensive review of symptoms was completed and negative except as noted in the HPI.      Objective:     Vitals:    03/20/24 0931   BP: 123/84   BP Location: Left arm   Patient Position: Sitting   BP Method: Medium (Automatic)   Pulse: 83   Temp: 97.9 °F (36.6 °C)   TempSrc: Oral   Weight: 66.4 kg (146 lb 4.8 oz)   Height: 5' 7" (1.702 m)           Physical Exam  Vitals reviewed.   Constitutional:       Appearance: Normal appearance.   HENT:      Right Ear: Tympanic membrane normal.      Left Ear: Tympanic membrane normal.      Nose: Nose normal.      Mouth/Throat:      Pharynx: " Oropharynx is clear.   Eyes:      Conjunctiva/sclera: Conjunctivae normal.   Cardiovascular:      Rate and Rhythm: Normal rate and regular rhythm.      Heart sounds: Normal heart sounds. No murmur heard.     No friction rub. No gallop.   Pulmonary:      Breath sounds: Normal breath sounds.   Abdominal:      Palpations: Abdomen is soft.      Tenderness: There is no abdominal tenderness.   Musculoskeletal:         General: Normal range of motion.      Cervical back: Neck supple.   Skin:     Findings: No rash.   Neurological:      General: No focal deficit present.           Assessment/Plan:        ADHD (attention deficit hyperactivity disorder), combined type  -     lisdexamfetamine (VYVANSE) 20 MG capsule; Take 1 capsule (20 mg total) by mouth every morning.  Dispense: 30 capsule; Refill: 0    Anxiety  -     buPROPion (WELLBUTRIN XL) 300 MG 24 hr tablet; Take 1 tablet (300 mg total) by mouth once daily.  Dispense: 30 tablet; Refill: 0  -     buPROPion (WELLBUTRIN XL) 150 MG TB24 tablet; Take 1 tablet (150 mg total) by mouth once daily.  Dispense: 30 tablet; Refill: 0            Outpatient Encounter Medications as of 3/20/2024   Medication Sig Dispense Refill    buPROPion (WELLBUTRIN XL) 300 MG 24 hr tablet Take 1 tablet (300 mg total) by mouth once daily. 90 tablet 0    lisdexamfetamine (VYVANSE) 20 MG capsule Take 1 capsule (20 mg total) by mouth every morning. 30 capsule 0    buPROPion (WELLBUTRIN XL) 150 MG TB24 tablet Take 300 mg by mouth once daily.      buPROPion (WELLBUTRIN XL) 150 MG TB24 tablet Take 1 tablet (150 mg total) by mouth once daily. 30 tablet 0    buPROPion (WELLBUTRIN XL) 300 MG 24 hr tablet Take 1 tablet (300 mg total) by mouth once daily. 30 tablet 0    lisdexamfetamine (VYVANSE) 20 MG capsule Take 1 capsule (20 mg total) by mouth every morning. 30 capsule 0    VYVANSE 30 mg capsule Take 20 mg by mouth nightly.       No facility-administered encounter medications on file as of 3/20/2024.          Next scheduled follow-up appointment for ADD/ADHD management will be in 3 months.  If changes are made to medications, then will need to follow up in three to four weeks.    We discussed the management goals for patients with ADHD:  1. Adequate Control of Focus and/or Behavior.  2. Tolerable Medication Side-Effects (Including some Loss of Appetite).  Need to maintain weight.  3. Function well in life, school and/or work.      Increase to Wellbutrin  daily  Return to clinic in one month.

## 2024-06-11 ENCOUNTER — OFFICE VISIT (OUTPATIENT)
Dept: PEDIATRICS | Facility: CLINIC | Age: 24
End: 2024-06-11
Payer: COMMERCIAL

## 2024-06-11 VITALS
WEIGHT: 145.13 LBS | TEMPERATURE: 99 F | HEIGHT: 67 IN | HEART RATE: 82 BPM | DIASTOLIC BLOOD PRESSURE: 78 MMHG | BODY MASS INDEX: 22.78 KG/M2 | SYSTOLIC BLOOD PRESSURE: 126 MMHG

## 2024-06-11 DIAGNOSIS — F90.2 ADHD (ATTENTION DEFICIT HYPERACTIVITY DISORDER), COMBINED TYPE: ICD-10-CM

## 2024-06-11 DIAGNOSIS — F41.9 ANXIETY: Primary | ICD-10-CM

## 2024-06-11 DIAGNOSIS — Z00.00 WELL ADULT EXAM: ICD-10-CM

## 2024-06-11 PROCEDURE — 3078F DIAST BP <80 MM HG: CPT | Mod: CPTII,S$GLB,, | Performed by: PEDIATRICS

## 2024-06-11 PROCEDURE — 90471 IMMUNIZATION ADMIN: CPT | Mod: S$GLB,,, | Performed by: PEDIATRICS

## 2024-06-11 PROCEDURE — 99395 PREV VISIT EST AGE 18-39: CPT | Mod: 25,S$GLB,, | Performed by: PEDIATRICS

## 2024-06-11 PROCEDURE — 99214 OFFICE O/P EST MOD 30 MIN: CPT | Mod: 25,S$GLB,, | Performed by: PEDIATRICS

## 2024-06-11 PROCEDURE — 1160F RVW MEDS BY RX/DR IN RCRD: CPT | Mod: CPTII,S$GLB,, | Performed by: PEDIATRICS

## 2024-06-11 PROCEDURE — 1159F MED LIST DOCD IN RCRD: CPT | Mod: CPTII,S$GLB,, | Performed by: PEDIATRICS

## 2024-06-11 PROCEDURE — 3008F BODY MASS INDEX DOCD: CPT | Mod: CPTII,S$GLB,, | Performed by: PEDIATRICS

## 2024-06-11 PROCEDURE — 3074F SYST BP LT 130 MM HG: CPT | Mod: CPTII,S$GLB,, | Performed by: PEDIATRICS

## 2024-06-11 PROCEDURE — 90715 TDAP VACCINE 7 YRS/> IM: CPT | Mod: S$GLB,,, | Performed by: PEDIATRICS

## 2024-06-11 PROCEDURE — 99999 PR PBB SHADOW E&M-EST. PATIENT-LVL III: CPT | Mod: PBBFAC,,, | Performed by: PEDIATRICS

## 2024-06-11 RX ORDER — BUPROPION HYDROCHLORIDE 300 MG/1
300 TABLET ORAL DAILY
Qty: 90 TABLET | Refills: 0 | Status: SHIPPED | OUTPATIENT
Start: 2024-06-11 | End: 2024-09-09

## 2024-06-11 RX ORDER — BUPROPION HYDROCHLORIDE 150 MG/1
150 TABLET ORAL DAILY
Qty: 90 TABLET | Refills: 0 | Status: SHIPPED | OUTPATIENT
Start: 2024-06-11 | End: 2024-09-09

## 2024-06-11 RX ORDER — LISDEXAMFETAMINE DIMESYLATE CAPSULES 20 MG/1
20 CAPSULE ORAL EVERY MORNING
Qty: 30 CAPSULE | Refills: 0 | Status: SHIPPED | OUTPATIENT
Start: 2024-07-11 | End: 2024-08-10

## 2024-06-11 RX ORDER — LISDEXAMFETAMINE DIMESYLATE CAPSULES 20 MG/1
20 CAPSULE ORAL EVERY MORNING
Qty: 30 CAPSULE | Refills: 0 | Status: SHIPPED | OUTPATIENT
Start: 2024-06-11 | End: 2024-07-11

## 2024-06-11 RX ORDER — LISDEXAMFETAMINE DIMESYLATE CAPSULES 20 MG/1
20 CAPSULE ORAL EVERY MORNING
Qty: 30 CAPSULE | Refills: 0 | Status: SHIPPED | OUTPATIENT
Start: 2024-08-10 | End: 2024-09-09

## 2024-06-11 NOTE — PROGRESS NOTES
"  Subjective  Niya Melissa is a 23 y.o. female who is here for a checkup accompanied by sibling, who is a historian.      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    Attentive with homework: Yes    Side effects of Medicine:  Sleep, appetite or other? No    Current ADHD Medication/Dose: Vyvanse 20 mg             Anxiety symptoms have improved since last visit.    Counselor, if seeing: Yes, Светлана Lin at [facility]    Patient feels controlled on medication: Yes  Panic attacks: No    Current Anxiety Medication/Dose: Wellbutrin  mg  Any side effects of medication: dizziness, drowsiness, dry mouth, appetite changes?  No            Review of patient's allergies indicates:   Allergen Reactions    Wasp venom Rash       Patient Active Problem List   Diagnosis    Scoliosis    Anxiety    ADHD (attention deficit hyperactivity disorder), combined type           Objective:      PHYSICAL EXAM  Vitals:    06/11/24 0947   BP: 126/78   BP Location: Left arm   Patient Position: Sitting   BP Method: Medium (Automatic)   Pulse: 82   Temp: 98.5 °F (36.9 °C)   TempSrc: Oral   Weight: 65.8 kg (145 lb 2 oz)   Height: 5' 6.75" (1.695 m)         Height Percentile for Age  Facility age limit for growth %aurelio is 20 years.    Weight Percentile for Age  Facility age limit for growth %aurelio is 20 years.    Body Mass Index  Body mass index is 22.9 kg/m².  Facility age limit for growth %aurelio is 20 years.      Physical Exam  Vitals reviewed.   Constitutional:       Appearance: Normal appearance.   HENT:      Right Ear: Tympanic membrane normal.      Left Ear: Tympanic membrane normal.      Nose: Nose normal.      " Mouth/Throat:      Pharynx: Oropharynx is clear.   Eyes:      Conjunctiva/sclera: Conjunctivae normal.   Cardiovascular:      Rate and Rhythm: Normal rate and regular rhythm.      Heart sounds: Normal heart sounds. No murmur heard.     No friction rub. No gallop.   Pulmonary:      Breath sounds: Normal breath sounds.   Abdominal:      Palpations: Abdomen is soft.      Tenderness: There is no abdominal tenderness.   Musculoskeletal:         General: Normal range of motion.      Cervical back: Neck supple.   Skin:     Findings: No rash.   Neurological:      General: No focal deficit present.           Assessment/Plan:      Anxiety  -     buPROPion (WELLBUTRIN XL) 300 MG 24 hr tablet; Take 1 tablet (300 mg total) by mouth once daily.  Dispense: 90 tablet; Refill: 0  -     buPROPion (WELLBUTRIN XL) 150 MG TB24 tablet; Take 1 tablet (150 mg total) by mouth once daily.  Dispense: 90 tablet; Refill: 0    Well adult exam    ADHD (attention deficit hyperactivity disorder), combined type  -     lisdexamfetamine (VYVANSE) 20 MG capsule; Take 1 capsule (20 mg total) by mouth every morning.  Dispense: 30 capsule; Refill: 0  -     lisdexamfetamine (VYVANSE) 20 MG capsule; Take 1 capsule (20 mg total) by mouth every morning.  Dispense: 30 capsule; Refill: 0  -     lisdexamfetamine (VYVANSE) 20 MG capsule; Take 1 capsule (20 mg total) by mouth every morning.  Dispense: 30 capsule; Refill: 0    Other orders  -     Tdap (BOOSTRIX) vaccine injection 0.5 mL      Healthy     PLAN:  1.  Discussed anticipatory guidance (including nutrition, education, safety, dental care, discipline, family, exercise, physical acticvity) and given age appropriate hand out.   Age appropriate physical activity and nutritional counseling were completed during today's visit.  2.  Immunizations received.  May give Acetaminophen (Tylenol).  3.  Discussed after hours care and advice - call [phone] (our office).  4.  Follow-up at next well child visit  (Regular Supervision Visit) in one year or sooner prn.        Next scheduled follow-up appointment for Anxiety management will be in 3 months.  If changes are made to medications, then will need to follow up in three to four weeks.  Call or see us immediately if self harm thoughts surface.        We discussed the management goals for patients with Anxiety:  1. Adequate Control of Anxiety.  2. Better understanding of anxious feelings.  3. Tolerable Medication Side-Effects (Including Loss of Appetite and Insomnia).  4. Function well in life, school and/or work.          Next scheduled follow-up appointment for ADD/ADHD management will be in 3 months.  If changes are made to medications, then will need to follow up in three to four weeks.    We discussed the management goals for patients with ADHD:  1. Adequate Control of Focus and/or Behavior.  2. Tolerable Medication Side Effects (Including some Loss of Appetite).  Need to maintain weight.  3. Function well in life, school and/or work.

## 2024-06-11 NOTE — PATIENT INSTRUCTIONS
Dr. Durham, Saima Willett Cook  Pediatric and Adolescent Medicine  [phone]        [facility]    Nutrition:  - Limit snacks, fast food, dessert, junk food.  Eat regular meals    Teeth:  FLOSS, brush minimum twice a day  Fluoride mouth wash, i. e. Act  - Dentist regularly  - You do want teeth after fifty years old?    Toxics:  - Alcohol, drugs, tobacco  Lots of availability, temptation    Driving:  over 5,000 college age die and >500,000 are injured each year from MVAs  Look three cars ahead  Do NOT text and drive  Wear your seat belt  Use your mirrors with constant surveillance    Breast Self Exam:  - 1/7 women develop breast cancer, check yourself regularly    Dating:  - [place] are sexually driven, girls have a need to be loved  - Do you want to be a parent right now?  - Abstain  - Learn from all your relationships  - If break-up, speak nicely of him/her  - You will find the right partner at the right time.    Academics:      - [employer] Success  Prepare- read through material before class  Attend class  Review notes same day as class  Back-it-up:  Before test study at least three days before test  Professor- pick, if possible, after reviews by friends or <[employer]> or <[employer]>  Meet your professor outside of class, so they know your face    -Extra help- take advantage of study skills classes, academic centers or tutors.  Get ahead of any potential problems.  - Ideas- study partners or groups, but must study (not just social)    Social Media:  - Be cautious what you put on social media- available to many and more permament than you think  - Your reputation will influence jobs and much else in the future.  - Social media posts are more permament than you think.    Exercise:  Don't be a couch potato  Work toward an hour each day of aerobic exercise (min 30 minutes)  Play tennis, golf, pickle ball, swim, walk/run, etc        Stay in touch:  - Contact your parents regularly during your  schooling and beyond- mom & dad  - text is really easy    Our office:  - We are honored to continue being your doctor at least until you finish schooling.  - Once 18 years old, you have doctor/patient confidentiality.  Your parents cannot receive information from us unless you allow.    Immunizations:  - MCV4 (Meningococcal), need two, one after 16 years old  - TdaP (Tetanus)- within last 10 years  - HPV- helps cervical cancer in girls; urogenital cancer in boys and girls

## 2024-09-10 ENCOUNTER — OFFICE VISIT (OUTPATIENT)
Dept: PEDIATRICS | Facility: CLINIC | Age: 24
End: 2024-09-10
Payer: COMMERCIAL

## 2024-09-10 VITALS
DIASTOLIC BLOOD PRESSURE: 71 MMHG | HEART RATE: 76 BPM | BODY MASS INDEX: 22.19 KG/M2 | WEIGHT: 141.38 LBS | HEIGHT: 67 IN | TEMPERATURE: 98 F | SYSTOLIC BLOOD PRESSURE: 110 MMHG

## 2024-09-10 DIAGNOSIS — F41.9 ANXIETY: Primary | ICD-10-CM

## 2024-09-10 DIAGNOSIS — F90.2 ADHD (ATTENTION DEFICIT HYPERACTIVITY DISORDER), COMBINED TYPE: ICD-10-CM

## 2024-09-10 PROCEDURE — 99213 OFFICE O/P EST LOW 20 MIN: CPT | Mod: S$GLB,,, | Performed by: PEDIATRICS

## 2024-09-10 PROCEDURE — 3008F BODY MASS INDEX DOCD: CPT | Mod: CPTII,S$GLB,, | Performed by: PEDIATRICS

## 2024-09-10 PROCEDURE — 3074F SYST BP LT 130 MM HG: CPT | Mod: CPTII,S$GLB,, | Performed by: PEDIATRICS

## 2024-09-10 PROCEDURE — 3078F DIAST BP <80 MM HG: CPT | Mod: CPTII,S$GLB,, | Performed by: PEDIATRICS

## 2024-09-10 PROCEDURE — 1159F MED LIST DOCD IN RCRD: CPT | Mod: CPTII,S$GLB,, | Performed by: PEDIATRICS

## 2024-09-10 PROCEDURE — 99999 PR PBB SHADOW E&M-EST. PATIENT-LVL III: CPT | Mod: PBBFAC,,, | Performed by: PEDIATRICS

## 2024-09-10 RX ORDER — LISDEXAMFETAMINE DIMESYLATE 20 MG/1
20 CAPSULE ORAL EVERY MORNING
Qty: 30 CAPSULE | Refills: 0 | Status: SHIPPED | OUTPATIENT
Start: 2024-09-10 | End: 2024-10-10

## 2024-09-10 RX ORDER — BUPROPION HYDROCHLORIDE 150 MG/1
150 TABLET ORAL DAILY
Qty: 90 TABLET | Refills: 0 | Status: SHIPPED | OUTPATIENT
Start: 2024-09-10 | End: 2024-12-09

## 2024-09-10 RX ORDER — LISDEXAMFETAMINE DIMESYLATE 20 MG/1
20 CAPSULE ORAL EVERY MORNING
Qty: 30 CAPSULE | Refills: 0 | Status: SHIPPED | OUTPATIENT
Start: 2024-09-19 | End: 2024-10-19

## 2024-09-10 RX ORDER — BUPROPION HYDROCHLORIDE 300 MG/1
300 TABLET ORAL DAILY
Qty: 90 TABLET | Refills: 0 | Status: SHIPPED | OUTPATIENT
Start: 2024-09-10 | End: 2024-12-09

## 2024-09-10 RX ORDER — LISDEXAMFETAMINE DIMESYLATE 20 MG/1
20 CAPSULE ORAL EVERY MORNING
Qty: 30 CAPSULE | Refills: 0 | Status: SHIPPED | OUTPATIENT
Start: 2024-10-19 | End: 2024-11-18

## 2024-09-10 NOTE — PROGRESS NOTES
"    Subjective:   HPI:  Niya Melissa is a 23 y.o. patient here for follow up ADHD visit,  accompanied by patient and sister, who is a historian    her  ADHD symptoms have remained the same since last visit.    Attentive in afternoon (with homework): Yes    Side effects of Medicine:    Trouble Sleeping; Appetite Changes; Jitteriness; Irritability or moodiness; Headaches or Stomach Aches; Other? 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 No    Current ADHD Medication/Dose in am: One dose of Vyvanse 20 mg    Afternoon medicine?   Dose- None   Taking daily? No, typically only on school days     Concerns? no      Last RHS:  6/11/24             Anxiety symptoms have remained the same since last visit.    Counselor, if seeing: No    Patient feels controlled on medication: Yes  Panic attacks: No    Current Anxiety Medication/Dose: one dose of Wellbutrin  mg and one dose of Wellbutrin  mg taken together in the morning  Any side effects of medication: dizziness, drowsiness, dry mouth, appetite changes?  None    Niya's allergies, medications, history, and problem list were updated as appropriate.    Review of patient's allergies indicates:   Allergen Reactions    Wasp venom Rash       Patient Active Problem List   Diagnosis    Scoliosis    Anxiety    ADHD (attention deficit hyperactivity disorder), combined type         Review of Systems  A comprehensive review of symptoms was completed and negative except as noted in the HPI.      Objective:     Vitals:    09/10/24 0948   BP: 110/71   BP Location: Left arm   Patient Position: Sitting   BP Method: Medium (Automatic)   Pulse: 76   Temp: 97.9 °F (36.6 °C)   TempSrc: Oral   Weight: 64.1 kg (141 lb 6 oz)   Height: 5' 7" (1.702 m)           Physical Exam  Vitals reviewed.   Constitutional:       Appearance: Normal appearance.   HENT:      Right Ear: Tympanic membrane normal.      Left Ear: " Tympanic membrane normal.      Nose: Nose normal.      Mouth/Throat:      Pharynx: Oropharynx is clear.   Eyes:      Conjunctiva/sclera: Conjunctivae normal.   Cardiovascular:      Rate and Rhythm: Normal rate and regular rhythm.      Heart sounds: Normal heart sounds. No murmur heard.     No friction rub. No gallop.   Pulmonary:      Breath sounds: Normal breath sounds.   Abdominal:      Palpations: Abdomen is soft.      Tenderness: There is no abdominal tenderness.   Musculoskeletal:         General: Normal range of motion.      Cervical back: Neck supple.   Skin:     Findings: No rash.   Neurological:      General: No focal deficit present.           Assessment/Plan:        Anxiety  -     lisdexamfetamine (VYVANSE) 20 MG capsule; Take 1 capsule (20 mg total) by mouth every morning.  Dispense: 30 capsule; Refill: 0  -     lisdexamfetamine (VYVANSE) 20 MG capsule; Take 1 capsule (20 mg total) by mouth every morning.  Dispense: 30 capsule; Refill: 0    ADHD (attention deficit hyperactivity disorder), combined type  -     lisdexamfetamine (VYVANSE) 20 MG capsule; Take 1 capsule (20 mg total) by mouth every morning.  Dispense: 30 capsule; Refill: 0  -     buPROPion (WELLBUTRIN XL) 300 MG 24 hr tablet; Take 1 tablet (300 mg total) by mouth once daily.  Dispense: 90 tablet; Refill: 0  -     buPROPion (WELLBUTRIN XL) 150 MG TB24 tablet; Take 1 tablet (150 mg total) by mouth once daily.  Dispense: 90 tablet; Refill: 0            Outpatient Encounter Medications as of 9/10/2024   Medication Sig Dispense Refill    buPROPion (WELLBUTRIN XL) 150 MG TB24 tablet Take 300 mg by mouth once daily.      buPROPion (WELLBUTRIN XL) 300 MG 24 hr tablet Take 1 tablet (300 mg total) by mouth once daily. 90 tablet 0    lisdexamfetamine (VYVANSE) 20 MG capsule Take 1 capsule (20 mg total) by mouth every morning. 30 capsule 0    buPROPion (WELLBUTRIN XL) 150 MG TB24 tablet Take 1 tablet (150 mg total) by mouth once daily. 90 tablet 0     buPROPion (WELLBUTRIN XL) 150 MG TB24 tablet Take 1 tablet (150 mg total) by mouth once daily. 90 tablet 0    buPROPion (WELLBUTRIN XL) 300 MG 24 hr tablet Take 1 tablet (300 mg total) by mouth once daily. 90 tablet 0    lisdexamfetamine (VYVANSE) 20 MG capsule Take 1 capsule (20 mg total) by mouth every morning. 30 capsule 0    [START ON 9/19/2024] lisdexamfetamine (VYVANSE) 20 MG capsule Take 1 capsule (20 mg total) by mouth every morning. 30 capsule 0    [START ON 10/19/2024] lisdexamfetamine (VYVANSE) 20 MG capsule Take 1 capsule (20 mg total) by mouth every morning. 30 capsule 0    VYVANSE 30 mg capsule Take 20 mg by mouth nightly. (Patient not taking: Reported on 9/10/2024)       No facility-administered encounter medications on file as of 9/10/2024.         Next scheduled follow-up appointment for ADD/ADHD management will be in 3 months.  If changes are made to medications, then will need to follow up in three to four weeks.    We discussed the management goals for patients with ADHD:  1. Adequate Control of Focus and/or Behavior.  2. Tolerable Medication Side-Effects (Including some Loss of Appetite).  Need to maintain weight.  3. Function well in life, school and/or work.

## 2024-12-10 ENCOUNTER — OFFICE VISIT (OUTPATIENT)
Dept: PEDIATRICS | Facility: CLINIC | Age: 24
End: 2024-12-10
Payer: COMMERCIAL

## 2024-12-10 VITALS
HEART RATE: 90 BPM | TEMPERATURE: 98 F | HEIGHT: 67 IN | BODY MASS INDEX: 19.59 KG/M2 | WEIGHT: 124.81 LBS | DIASTOLIC BLOOD PRESSURE: 78 MMHG | SYSTOLIC BLOOD PRESSURE: 109 MMHG

## 2024-12-10 DIAGNOSIS — F90.2 ADHD (ATTENTION DEFICIT HYPERACTIVITY DISORDER), COMBINED TYPE: ICD-10-CM

## 2024-12-10 DIAGNOSIS — F41.9 ANXIETY: Primary | ICD-10-CM

## 2024-12-10 PROCEDURE — 99999 PR PBB SHADOW E&M-EST. PATIENT-LVL III: CPT | Mod: PBBFAC,,, | Performed by: PEDIATRICS

## 2024-12-10 PROCEDURE — 3078F DIAST BP <80 MM HG: CPT | Mod: CPTII,S$GLB,, | Performed by: PEDIATRICS

## 2024-12-10 PROCEDURE — 90656 IIV3 VACC NO PRSV 0.5 ML IM: CPT | Mod: S$GLB,,, | Performed by: PEDIATRICS

## 2024-12-10 PROCEDURE — 3074F SYST BP LT 130 MM HG: CPT | Mod: CPTII,S$GLB,, | Performed by: PEDIATRICS

## 2024-12-10 PROCEDURE — 3008F BODY MASS INDEX DOCD: CPT | Mod: CPTII,S$GLB,, | Performed by: PEDIATRICS

## 2024-12-10 PROCEDURE — 1159F MED LIST DOCD IN RCRD: CPT | Mod: CPTII,S$GLB,, | Performed by: PEDIATRICS

## 2024-12-10 PROCEDURE — 90471 IMMUNIZATION ADMIN: CPT | Mod: S$GLB,,, | Performed by: PEDIATRICS

## 2024-12-10 PROCEDURE — 99214 OFFICE O/P EST MOD 30 MIN: CPT | Mod: 25,S$GLB,, | Performed by: PEDIATRICS

## 2024-12-10 RX ORDER — BUPROPION HYDROCHLORIDE 300 MG/1
300 TABLET ORAL DAILY
Qty: 90 TABLET | Refills: 0 | Status: SHIPPED | OUTPATIENT
Start: 2024-12-10 | End: 2025-03-10

## 2024-12-10 RX ORDER — LISDEXAMFETAMINE DIMESYLATE 20 MG/1
20 CAPSULE ORAL EVERY MORNING
COMMUNITY

## 2024-12-10 RX ORDER — BUPROPION HYDROCHLORIDE 150 MG/1
150 TABLET ORAL DAILY
Qty: 90 TABLET | Refills: 0 | Status: SHIPPED | OUTPATIENT
Start: 2024-12-10 | End: 2025-03-10

## 2024-12-10 RX ORDER — LISDEXAMFETAMINE DIMESYLATE 20 MG/1
20 CAPSULE ORAL EVERY MORNING
Qty: 30 CAPSULE | Refills: 0 | Status: SHIPPED | OUTPATIENT
Start: 2024-12-10 | End: 2025-01-09

## 2024-12-10 RX ORDER — LISDEXAMFETAMINE DIMESYLATE 20 MG/1
20 CAPSULE ORAL EVERY MORNING
Qty: 30 CAPSULE | Refills: 0 | Status: SHIPPED | OUTPATIENT
Start: 2025-02-07 | End: 2025-03-09

## 2024-12-10 RX ORDER — LISDEXAMFETAMINE DIMESYLATE 20 MG/1
20 CAPSULE ORAL EVERY MORNING
Qty: 30 CAPSULE | Refills: 0 | Status: SHIPPED | OUTPATIENT
Start: 2025-01-08 | End: 2025-02-07

## 2024-12-10 NOTE — PROGRESS NOTES
"    Subjective:   HPI:  Niya Melissa is a 24 y.o. patient here for follow up ADHD visit,  accompanied by patient and sister, who is a historian    Eating healthier; dropped about 17#; working out    her  ADHD symptoms have been the same since last visit.    Attentive in afternoon (with homework): Yes    Side effects of Medicine:    Trouble Sleeping; Appetite Changes; Jitteriness; Irritability or moodiness; Headaches or Stomach Aches; Other? None    Occupation:n/a  Performance: n/a    Accommodations? No    Current ADHD Medication/Dose in am: Vyvanse 20 mg   Afternoon medicine?   Dose- n/a   Taking daily? Yes    Concerns? no    Last RHS:  6/11/2024             Anxiety symptoms have been the same since last visit.    Counselor, if seeing: No    Patient feels controlled on medication: Yes  Panic attacks: none    Current Anxiety Medication/Dose: Wellbutrin  mg, Wellbutrin  mg  Any side effects of medication: dizziness, drowsiness, dry mouth, appetite changes?  none   Niya's allergies, medications, history, and problem list were updated as appropriate.    Review of patient's allergies indicates:   Allergen Reactions    Wasp venom Rash       Patient Active Problem List   Diagnosis    Scoliosis    Anxiety    ADHD (attention deficit hyperactivity disorder), combined type         Review of Systems  A comprehensive review of symptoms was completed and negative except as noted in the HPI.      Objective:     Vitals:    12/10/24 0945   BP: 109/78   BP Location: Left arm   Patient Position: Sitting   Pulse: 90   Temp: 98.2 °F (36.8 °C)   TempSrc: Oral   Weight: 56.6 kg (124 lb 12.8 oz)   Height: 5' 6.88" (1.699 m)       Last 3 Weights:   Wt Readings from Last 3 Encounters:   12/10/24 0945 56.6 kg (124 lb 12.8 oz)   09/10/24 0948 64.1 kg (141 lb 6 oz)   06/11/24 0947 65.8 kg (145 lb 2 oz)         Physical Exam  Vitals reviewed.   Constitutional:       Appearance: Normal appearance.   HENT:      Right Ear: Tympanic " membrane normal.      Left Ear: Tympanic membrane normal.      Nose: Nose normal.      Mouth/Throat:      Pharynx: Oropharynx is clear.   Eyes:      Conjunctiva/sclera: Conjunctivae normal.   Cardiovascular:      Rate and Rhythm: Normal rate and regular rhythm.      Heart sounds: Normal heart sounds. No murmur heard.     No friction rub. No gallop.   Pulmonary:      Breath sounds: Normal breath sounds.   Abdominal:      Palpations: Abdomen is soft.      Tenderness: There is no abdominal tenderness.   Musculoskeletal:         General: Normal range of motion.      Cervical back: Neck supple.   Skin:     Findings: No rash.   Neurological:      General: No focal deficit present.           Assessment/Plan:        Anxiety  -     buPROPion (WELLBUTRIN XL) 150 MG TB24 tablet; Take 1 tablet (150 mg total) by mouth once daily.  Dispense: 90 tablet; Refill: 0  -     buPROPion (WELLBUTRIN XL) 300 MG 24 hr tablet; Take 1 tablet (300 mg total) by mouth once daily.  Dispense: 90 tablet; Refill: 0  -     influenza (Flulaval, Fluzone, Fluarix) 45 mcg/0.5 mL IM vaccine (> or = 6 mo) 0.5 mL    ADHD (attention deficit hyperactivity disorder), combined type  -     lisdexamfetamine (VYVANSE) 20 MG capsule; Take 1 capsule (20 mg total) by mouth every morning.  Dispense: 30 capsule; Refill: 0  -     lisdexamfetamine (VYVANSE) 20 MG capsule; Take 1 capsule (20 mg total) by mouth every morning.  Dispense: 30 capsule; Refill: 0  -     lisdexamfetamine (VYVANSE) 20 MG capsule; Take 1 capsule (20 mg total) by mouth every morning.  Dispense: 30 capsule; Refill: 0            Outpatient Encounter Medications as of 12/10/2024   Medication Sig Dispense Refill    buPROPion (WELLBUTRIN XL) 150 MG TB24 tablet Take 1 tablet (150 mg total) by mouth once daily. 90 tablet 0    buPROPion (WELLBUTRIN XL) 300 MG 24 hr tablet Take 1 tablet (300 mg total) by mouth once daily. 90 tablet 0    lisdexamfetamine (VYVANSE) 20 MG capsule Take 20 mg by mouth every  morning.      buPROPion (WELLBUTRIN XL) 150 MG TB24 tablet Take 300 mg by mouth once daily. (Patient not taking: Reported on 12/10/2024)      buPROPion (WELLBUTRIN XL) 150 MG TB24 tablet Take 1 tablet (150 mg total) by mouth once daily. 90 tablet 0    buPROPion (WELLBUTRIN XL) 300 MG 24 hr tablet Take 1 tablet (300 mg total) by mouth once daily. 90 tablet 0    lisdexamfetamine (VYVANSE) 20 MG capsule Take 1 capsule (20 mg total) by mouth every morning. 30 capsule 0    lisdexamfetamine (VYVANSE) 20 MG capsule Take 1 capsule (20 mg total) by mouth every morning. 30 capsule 0    [START ON 1/8/2025] lisdexamfetamine (VYVANSE) 20 MG capsule Take 1 capsule (20 mg total) by mouth every morning. 30 capsule 0    [START ON 2/7/2025] lisdexamfetamine (VYVANSE) 20 MG capsule Take 1 capsule (20 mg total) by mouth every morning. 30 capsule 0    VYVANSE 30 mg capsule Take 20 mg by mouth nightly. (Patient not taking: Reported on 12/10/2024)       Facility-Administered Encounter Medications as of 12/10/2024   Medication Dose Route Frequency Provider Last Rate Last Admin    [COMPLETED] influenza (Flulaval, Fluzone, Fluarix) 45 mcg/0.5 mL IM vaccine (> or = 6 mo) 0.5 mL  0.5 mL Intramuscular 1 time in Clinic/HOD    0.5 mL at 12/10/24 1004         Next scheduled follow-up appointment for ADD/ADHD management will be in 3 months.  If changes are made to medications, then will need to follow up in three to four weeks.    We discussed the management goals for patients with ADHD:  1. Adequate Control of Focus and/or Behavior.  2. Tolerable Medication Side-Effects (Including some Loss of Appetite).  Need to maintain weight.  3. Function well in life, school and/or work.